# Patient Record
Sex: MALE | Race: OTHER | NOT HISPANIC OR LATINO | URBAN - METROPOLITAN AREA
[De-identification: names, ages, dates, MRNs, and addresses within clinical notes are randomized per-mention and may not be internally consistent; named-entity substitution may affect disease eponyms.]

---

## 2018-04-20 ENCOUNTER — EMERGENCY (EMERGENCY)
Facility: HOSPITAL | Age: 38
LOS: 1 days | Discharge: HOME | End: 2018-04-20
Admitting: EMERGENCY MEDICINE
Payer: SELF-PAY

## 2018-04-20 VITALS
DIASTOLIC BLOOD PRESSURE: 84 MMHG | OXYGEN SATURATION: 99 % | HEART RATE: 90 BPM | TEMPERATURE: 98 F | RESPIRATION RATE: 18 BRPM | SYSTOLIC BLOOD PRESSURE: 127 MMHG

## 2018-04-20 DIAGNOSIS — R07.0 PAIN IN THROAT: ICD-10-CM

## 2018-04-20 DIAGNOSIS — Z79.899 OTHER LONG TERM (CURRENT) DRUG THERAPY: ICD-10-CM

## 2018-04-20 DIAGNOSIS — J36 PERITONSILLAR ABSCESS: ICD-10-CM

## 2018-04-20 PROCEDURE — 99284 EMERGENCY DEPT VISIT MOD MDM: CPT | Mod: 25

## 2018-04-20 PROCEDURE — 99284 EMERGENCY DEPT VISIT MOD MDM: CPT

## 2018-04-20 PROCEDURE — 96375 TX/PRO/DX INJ NEW DRUG ADDON: CPT

## 2018-04-20 PROCEDURE — 96374 THER/PROPH/DIAG INJ IV PUSH: CPT

## 2018-04-20 RX ORDER — DEXAMETHASONE 0.5 MG/5ML
10 ELIXIR ORAL ONCE
Qty: 0 | Refills: 0 | Status: COMPLETED | OUTPATIENT
Start: 2018-04-20 | End: 2018-04-20

## 2018-04-20 RX ORDER — SODIUM CHLORIDE 9 MG/ML
1000 INJECTION INTRAMUSCULAR; INTRAVENOUS; SUBCUTANEOUS ONCE
Qty: 0 | Refills: 0 | Status: COMPLETED | OUTPATIENT
Start: 2018-04-20 | End: 2018-04-20

## 2018-04-20 RX ORDER — TETRACAINE/BENZOCAINE/BUTAMBEN 2%-14%-2%
1 OINTMENT (GRAM) TOPICAL ONCE
Qty: 0 | Refills: 0 | Status: COMPLETED | OUTPATIENT
Start: 2018-04-20 | End: 2018-04-20

## 2018-04-20 RX ORDER — TRAMADOL HYDROCHLORIDE 50 MG/1
1 TABLET ORAL
Qty: 12 | Refills: 0 | OUTPATIENT
Start: 2018-04-20 | End: 2018-04-21

## 2018-04-20 RX ORDER — IBUPROFEN 200 MG
1 TABLET ORAL
Qty: 30 | Refills: 0 | OUTPATIENT
Start: 2018-04-20 | End: 2018-05-19

## 2018-04-20 RX ORDER — OXYCODONE AND ACETAMINOPHEN 5; 325 MG/1; MG/1
1 TABLET ORAL ONCE
Qty: 0 | Refills: 0 | Status: DISCONTINUED | OUTPATIENT
Start: 2018-04-20 | End: 2018-04-20

## 2018-04-20 RX ORDER — KETOROLAC TROMETHAMINE 30 MG/ML
30 SYRINGE (ML) INJECTION ONCE
Qty: 0 | Refills: 0 | Status: DISCONTINUED | OUTPATIENT
Start: 2018-04-20 | End: 2018-04-20

## 2018-04-20 RX ADMIN — OXYCODONE AND ACETAMINOPHEN 1 TABLET(S): 5; 325 TABLET ORAL at 23:55

## 2018-04-20 RX ADMIN — Medication 100 MILLIGRAM(S): at 22:57

## 2018-04-20 RX ADMIN — Medication 102 MILLIGRAM(S): at 22:36

## 2018-04-20 RX ADMIN — SODIUM CHLORIDE 2000 MILLILITER(S): 9 INJECTION INTRAMUSCULAR; INTRAVENOUS; SUBCUTANEOUS at 22:36

## 2018-04-20 RX ADMIN — Medication 1 SPRAY(S): at 23:09

## 2018-04-20 RX ADMIN — Medication 30 MILLIGRAM(S): at 22:36

## 2018-04-20 NOTE — CONSULT NOTE ADULT - SUBJECTIVE AND OBJECTIVE BOX
37M presenting with 3-4 days of right sided throat pain and swelling, associated with dysphagia, odynophagia, fevers and chills as well as right sided ear ache. Pt received amoxicillin two days ago which he was taking with no relief. States he had a tooth ache similar to this several years ago and improved with amox. Pt denies dyspnea, SOB, weight loss or other constitutional symptoms.   Received decadron and toradol in the ER.     PMH: juvenile rheumatoid arthritis, hard of hearing  Medications: None  Allergies: None  SH: Past Tobacco abuse, quit 2 years ago, 30 pack years     PE:  General: awake, alert, NAD  Face: symmetric, no deformity  Nose: midline, no drainage  OC/OP: tongue midline, moist oral mucosa, right tonsillar swelling, no midline shift or deviation of uvula, small area of fluctuance noted overlying lasteral and anterior aspect of tonsil, left tonsil WNL  Neck: soft, trachea midline, no LAD    Abscess drainage:  right tonsillar fossa sprayed with cetacaine spray. 18 gauge needle used to localize area of pus. 3 cc of lidocaine with epinephrine injected into area of interest. 11 blade used to incise region and tonsil clamp used to spread around incision. 3 cc of pus obtained. Pt tolerated procedure well, instructed to gargle with peroxide and water.    A+P  37M with right intratonsillar abscess, s/p incision and drainage  -clindamycin 1 week  -pain control  -gargle with salt water after every meal to avoid food getting stuck in incision site  -f/u with ENT within 1 week to discuss possible removal of tonsils

## 2018-04-20 NOTE — ED ADULT NURSE NOTE - OBJECTIVE STATEMENT
37 year old male patient with c/o of R sided toothache x2days.  States pain while swallowing.  No distress noted.  ABle to tolerate secretions, but pain.  Breathing easily and unlabored.  A+OX3, ambulatory w steady gait.

## 2018-04-20 NOTE — ED ADULT NURSE NOTE - CHPI ED SYMPTOMS NEG
no tingling/no weakness/no nausea/no dizziness/no chills/no vomiting/no fever/no numbness/no decreased eating/drinking

## 2018-04-20 NOTE — ED PROVIDER NOTE - OBJECTIVE STATEMENT
36 y/o male with no sig PMHx c/o throat pain x 4 days. pt states pain to throat with swallowing and radiates to left ear. No change in voice. no difficulty swallowing. no fever or chills. pt reports self tx with amoxicillin past 2 days with out relief. pt unsure if related to tooth. pt denies recent dental pain and no recent dental work. no further complaints.

## 2018-04-20 NOTE — ED PROVIDER NOTE - ENMT, MLM
Airway patent, Nasal mucosa clear. Mouth with normal mucosa. + swelling to right tonsilar region with ? collection of pus.  and uvula is midline. no exudate. TMs pearly gray and intact b/l. no mastoid tenderness.

## 2018-04-20 NOTE — ED PROVIDER NOTE - MEDICAL DECISION MAKING DETAILS
throat pain x 1 day. + peritonsillar abscess on exam. pt tolerating po. fluid, decadron, toradol given. clinda given as per ENT recommendation. pt evalauted and I&D performed by ENT. will d/c home with clinda and pt to f/u with ENT this week in [place]

## 2025-02-19 NOTE — ED ADULT NURSE NOTE - RN DISCHARGE SIGNATURE
20-Apr-2018
You can access the FollowMyHealth Patient Portal offered by [facility] by registering at the following website: [email or web address]. By joining [employer]’s FollowMyHealth portal, you will also be able to view your health information using other applications (apps) compatible with our system.

## 2025-03-24 NOTE — ED ADULT NURSE NOTE - NS ED NURSE LEVEL OF CONSCIOUSNESS AFFECT
Calm [Time Spent: ___ minutes] : I have spent [unfilled] minutes of time on the encounter which excludes teaching and separately reported services.